# Patient Record
Sex: MALE | Race: WHITE | NOT HISPANIC OR LATINO | URBAN - METROPOLITAN AREA
[De-identification: names, ages, dates, MRNs, and addresses within clinical notes are randomized per-mention and may not be internally consistent; named-entity substitution may affect disease eponyms.]

---

## 2024-08-26 ENCOUNTER — LAB (OUTPATIENT)
Dept: LAB | Facility: LAB | Age: 24
End: 2024-08-26
Payer: COMMERCIAL

## 2024-08-27 LAB — COTININE UR QL SCN: NEGATIVE

## 2024-09-04 ENCOUNTER — TELEMEDICINE (OUTPATIENT)
Dept: PRIMARY CARE | Facility: CLINIC | Age: 24
End: 2024-09-04
Payer: COMMERCIAL

## 2024-09-04 DIAGNOSIS — R05.3 PERSISTENT COUGH: Primary | ICD-10-CM

## 2024-09-04 PROCEDURE — 99213 OFFICE O/P EST LOW 20 MIN: CPT | Performed by: NURSE PRACTITIONER

## 2024-09-04 RX ORDER — ALBUTEROL SULFATE 90 UG/1
2 INHALANT RESPIRATORY (INHALATION) EVERY 6 HOURS PRN
Qty: 26.8 G | Refills: 0 | Status: SHIPPED | OUTPATIENT
Start: 2024-09-04 | End: 2025-09-04

## 2024-09-04 RX ORDER — BENZONATATE 200 MG/1
200 CAPSULE ORAL 3 TIMES DAILY PRN
Qty: 42 CAPSULE | Refills: 0 | Status: SHIPPED | OUTPATIENT
Start: 2024-09-04 | End: 2024-10-04

## 2024-09-04 RX ORDER — ALBUTEROL SULFATE 90 UG/1
2 INHALANT RESPIRATORY (INHALATION) EVERY 6 HOURS PRN
Qty: 24 G | Refills: 11 | Status: SHIPPED | OUTPATIENT
Start: 2024-09-04 | End: 2024-09-04

## 2024-09-04 ASSESSMENT — ENCOUNTER SYMPTOMS
WHEEZING: 0
RHINORRHEA: 0
VOMITING: 0
SHORTNESS OF BREATH: 0
SWEATS: 0
DIZZINESS: 0
HEMOPTYSIS: 0
CHILLS: 0
APPETITE CHANGE: 0
COUGH: 1
HEADACHES: 0
DIARRHEA: 0
FEVER: 0
NAUSEA: 0
MYALGIAS: 0
LIGHT-HEADEDNESS: 0
ACTIVITY CHANGE: 0
HEARTBURN: 0
SORE THROAT: 1

## 2024-09-04 ASSESSMENT — COPD QUESTIONNAIRES: COPD: 0

## 2024-09-04 NOTE — PATIENT INSTRUCTIONS
Pleasure meeting with you today!    Let me know if you need anything.     Please send me a MyChart message if you have any questions or concerns.  FOR NON URGENT questions only.  Allow up to 72 hours for response.     If you have prescription issues or other questions you can email   Joel Jimenez,  Digital Health Coordinator, at   [email or web address]

## 2024-09-04 NOTE — PROGRESS NOTES
Subjective   Patient ID: Luis Alberto Munoz is a 24 y.o. male who presents for Cough (More than 2 weeks).    reports he had a cold/viral illness around 08/18/2024  All symptoms resolved except cough    Patient reports continues to have cough, was taking mucinex cough worse in AM/PM   Denies h/o allergies or asthma  All other symptoms have resolved    Cough is Occasionally productive  Denies smoking.   Denies fever        Cough  This is a new problem. The current episode started 1 to 4 weeks ago. The problem has been waxing and waning. The problem occurs every few hours. The cough is Non-productive. Associated symptoms include a sore throat. Pertinent negatives include no chest pain, chills, ear congestion, ear pain, fever, headaches, heartburn, hemoptysis, myalgias, nasal congestion, postnasal drip, rhinorrhea, shortness of breath, sweats or wheezing. The symptoms are aggravated by lying down. He has tried OTC cough suppressant for the symptoms. The treatment provided mild relief. There is no history of asthma, COPD, emphysema or environmental allergies.        Review of Systems   Constitutional:  Negative for activity change, appetite change, chills and fever.   HENT:  Positive for sore throat. Negative for ear pain, postnasal drip and rhinorrhea.    Respiratory:  Positive for cough. Negative for hemoptysis, shortness of breath and wheezing.    Cardiovascular:  Negative for chest pain.   Gastrointestinal:  Negative for diarrhea, heartburn, nausea and vomiting.   Musculoskeletal:  Negative for myalgias.   Allergic/Immunologic: Negative for environmental allergies.   Neurological:  Negative for dizziness, light-headedness and headaches.       Objective   There were no vitals taken for this visit.    Physical Exam  Constitutional:       General: He is not in acute distress.     Appearance: Normal appearance. He is not ill-appearing.      Comments: On Demand Virtual Visit Patient Consent     An interactive audio and video  telecommunication system which permits real time communications between the patient (at the originating site) and provider (at the distant site) was utilized to provide this telehealth service.   Verbal consent was requested and obtained from Luis Alberto Munoz (or parent if under 18) on this date, 24 for a telehealth visit.   I have verbally confirmed with Luis Alberto Munoz (or parent if under 18) that they are physically located in the [facility] during this virtual visit.    I performed this visit using realtime telehealth tools, including an audio/video OR telephone connection between the patient listed who was located in the [facility] and myself, Kevin Vazquez CNP (licensed in the [facility]).  At the start of the visit, I introduced myself as Kevin Vazquez Nurse practitioner and verified the patients name, , and current physical location.    If they were currently outside of the state [facility], the visit was ended and the patient was referred to alternative means for evaluation and treatment.   The patient was made aware of the limitations of the telehealth visit.  They will not be physically examined and all issues may not be appropriate for a telehealth visit.  If necessary, an in person referral will be made.       DISCLAIMER:   In preparing for this visit and writing this note, I reviewed previous electronic medical records (labs, imaging and medical charts) available.  Significant findings which helped in decision making are recorded in this encounter charting.    Telemedicine appropriate evaluation completed.  Unable to perform complete physical exam due to virtual visit, patient was visualized on interactive video.      Pulmonary:      Effort: Pulmonary effort is normal.      Comments: Cough not observed during visit  Neurological:      Mental Status: He is alert and oriented to person, place, and time.         Assessment/Plan   Diagnoses and all orders for this visit:  Persistent cough  -      benzonatate (Tessalon) 200 mg capsule; Take 1 capsule (200 mg) by mouth 3 times a day as needed for cough. Do not crush or chew.  -     albuterol (Ventolin HFA) 90 mcg/actuation inhaler; Inhale 2 puffs every 6 hours if needed for wheezing or shortness of breath.  Follow up with PCP if symptoms persist or worsen

## 2024-09-05 ENCOUNTER — PHARMACY VISIT (OUTPATIENT)
Dept: PHARMACY | Facility: CLINIC | Age: 24
End: 2024-09-05
Payer: COMMERCIAL

## 2024-09-05 PROCEDURE — RXMED WILLOW AMBULATORY MEDICATION CHARGE
